# Patient Record
Sex: FEMALE | Race: WHITE | NOT HISPANIC OR LATINO | URBAN - METROPOLITAN AREA
[De-identification: names, ages, dates, MRNs, and addresses within clinical notes are randomized per-mention and may not be internally consistent; named-entity substitution may affect disease eponyms.]

---
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
## 2024-05-17 NOTE — PROCEDURE: URINE PREGNANCY TEST
SBAR to [facility] for discharge plan and instructions at [facility].   
Performed By: Korin Man MA
Lot # (Optional): [id number]
Expiration Date (Optional): 06/01/2024
Urine Pregnancy Test Result: negative
Detail Level: None